# Patient Record
Sex: MALE | Race: WHITE | URBAN - NONMETROPOLITAN AREA
[De-identification: names, ages, dates, MRNs, and addresses within clinical notes are randomized per-mention and may not be internally consistent; named-entity substitution may affect disease eponyms.]

---

## 2017-09-14 ENCOUNTER — AMBULATORY - GICH (OUTPATIENT)
Dept: SCHEDULING | Facility: OTHER | Age: 72
End: 2017-09-14

## 2017-10-31 ENCOUNTER — OFFICE VISIT - GICH (OUTPATIENT)
Dept: ORTHOPEDICS | Facility: OTHER | Age: 72
End: 2017-10-31

## 2017-10-31 ENCOUNTER — HOSPITAL ENCOUNTER (OUTPATIENT)
Dept: RADIOLOGY | Facility: OTHER | Age: 72
End: 2017-10-31
Attending: ORTHOPAEDIC SURGERY

## 2017-10-31 ENCOUNTER — HISTORY (OUTPATIENT)
Dept: ORTHOPEDICS | Facility: OTHER | Age: 72
End: 2017-10-31

## 2017-10-31 DIAGNOSIS — M25.462 EFFUSION OF LEFT KNEE: ICD-10-CM

## 2017-10-31 DIAGNOSIS — M17.12 PRIMARY OSTEOARTHRITIS OF LEFT KNEE: ICD-10-CM

## 2017-10-31 DIAGNOSIS — M25.562 PAIN IN LEFT KNEE: ICD-10-CM

## 2017-12-27 NOTE — PROGRESS NOTES
Patient Information     Patient Name MRN Sex Jay Kay [id number] Male 1945      Progress Notes by Victor Hugo Mercado DO at 10/31/2017 12:45 PM     Author:  Victor Hugo Mercado DO Service:  (none) Author Type:  PHYS- Osteopathic     Filed:  10/31/2017  2:53 PM Encounter Date:  10/31/2017 Status:  Signed     :  Victor Hugo Mercado DO (PHYS- Osteopathic)            Jay Lang was seen for a chief complaint of left knee pain.    CHIEF COMPLAINT: Jay Lang is a 72 y.o.  male  Chief Complaint     Patient presents with       Consult      Left knee        HISTORY OF PRESENTING INJURY:  72-year-old male relates he began experiencing increased left knee pain over the past 6 months. He has received 2 cortisone injections to the knee. The most recent was about 3-4 months ago with no significant improvement of symptoms. He has pain to the knee area but often along the inside of the knee. Usually worse with weightbearing activities. Feeling a little bit better recently. The patient is from [place]. Relates he had x-rays of both knees taken in the past and was told he has arthritis more on the right side. He went to [facility] and had an MRI performed and presents here for initial left knee evaluation. No x-rays from outside source available at this time. The MRI available for review.  Treatment includes Tylenol.  Ambulates unassisted.  No significant right knee pain at this time.  No history of back or hip surgery.  Gen. health stable. The patient has a pilots license.    REVIEW OF SYSTEMS:  Constitutional:  Denies constitutional problems  Cardiovascular: normal  Respiratory: normal    The review of systems as documented in the HPI and on the intake questionnaire, completed by the patient 10/31/2017, have been reviewed by myself and the pertinent positives and negatives addressed.  The remainder of the complete review of systems was non-contributory.    ([facility]) PAST, FAMILY, and/or SOCIAL  HISTORY:    PAST MEDICAL HISTORY:  No past medical history on file.    PAST SURGICAL HISTORY:  No past surgical history on file.    ALLERGIES:  No Known Allergies    CURRENT MEDICATIONS:  No current outpatient prescriptions on file.     No current facility-administered medications for this visit.      Medications have been reviewed by me and are current to the best of my knowledge and ability.      FAMILY HISTORY:  No family history on file.    Additional [facility] information documented on the intake form completed by the patient 10/31/2017 was reviewed by myself.    PHYSICAL EXAM:   /78  Pulse 68  Ht 1.829 m (6')  Wt 95.3 kg (210 lb)  BMI 28.48 kg/m2 Body mass index is 28.48 kg/(m^2).    General Appearance: Pleasant male in good appearance, mood and affect.  Alert and orientated times three ( time, date and location).    Examination of Left knee:  Both knees demonstrate varus deformities with weightbearing. About 15 . Left greater than right.  Both knees demonstrate flexion contractures with standing and supine examination.  Both hips have comfortable passive motion with no anterior hip or groin pain.  No lumbar scars appreciated.    Left knee:  Skin: Normal.    Sensation is Normal  Alignment: Varum 15+ with weightbearing  Effusion: Mild to moderate  Passive extension: Lacks about 15+ degrees   Passive flexion: 120  Patella tracks:  without an inverted J sign  Varus stress testing: is stable  Valgus stress testing: is stable  Anterior drawer test: is stable  Posterior drawer test: is stable  Lachman's test: is stable  generalized medial sided knee pain with examination and palpation.    Hip: Bilateral  comfortable passive motion with no groin pain while seated or supine  both legs demonstrate mild lower extremity pitting edema.    negative pain with log roll testing    Calf:  negative tenderness    Neurological / Vascular Examination:    Sensation: Normal  Distal pulses: present    Feet:  Pes planus negative      Xray/MRI/MRA:  Radiographic images where independently reviewed and discussed with the patient.   X-ray of the left knee today demonstrates advanced osteoarthritis with medial bone-on-bone. Moderately advanced patellofemoral arthritis. Varus alignment.    MRI films and report demonstrates advanced left knee osteoarthritis involving the medial compartment with arthritic changes noted in the lateral compartment and patellofemoral.    IMPRESSION:  72-year-old male with advanced left knee osteoarthritis. Tricompartmental with bone-on-bone medial contact.  Varus deformity of the left knee and flexion contracture.  Right knee also demonstrates varus alignment and flexion contracture. No right knee x-rays for review.  History of prior left knee cortisone injection. The most recent about 3-4 months ago by history.     PLAN:  Discussion included review of x-ray and MRI. Discussed with the patient he has advanced left knee arthritis with associated flexion contracture and varus alignment.  Nonsurgical and surgical treatment options discussed. Nonsurgical including activity modification, arthritis medication, ice to the affected area.  Discussed cortisone and viscoelastic injections.  Also discussed recommendations to consider left knee replacement surgery.  The patient would like to try a left knee viscoelastic injection today.  The patient was informed regarding the cost of the injection.    PROCEDURE:  A written and oral informed consent was received from the patient.  Risks and benefits were discussed including but not limited to the risks of infection and continued symptoms. The patients left knee was sterilely prepped and draped after a timeout was performed. Utilizing ethyl chloride the area was cooled. The site was localized with 5 ml xylocaine 1% plain.  With sterile technique a 18 gauge needle was introduced and 32 ml of synovial fluid was aspirated.  Then 6 ml of synvisc one was injected.  A sterile bandage was  applied and the patient tolerated the procedure well.  They where given a handout about injections to take home.   Apply ice to the knee as needed.  The patient will call back as needed.  Questions answered.    Victor Hugo Mercado D.O., ROBLES.  Orthopedic Surgeon    [address] [address]  Phone [phone]  Fax [phone]    1:55 PM 10/31/2017

## 2017-12-30 NOTE — NURSING NOTE
Patient Information     Patient Name MRN Sex Jay Kay [id number] Male 1945      Nursing Note by Gosselin, Norma J at 10/31/2017 12:45 PM     Author:  Gosselin, Norma J  Service:  (none) Author Type:  (none)     Filed:  10/31/2017  4:19 PM  Encounter Date:  10/31/2017 Status:  Addendum     :  Gosselin, Norma J        Related Notes: Original Note by Gosselin, Norma J filed at 10/31/2017 12:55 PM            Consult left knee pain.  Norma J Gosselin LPN....................  10/31/2017   12:46 PM  Universal Protocol    A. Pre-procedure verification complete yes  1-relevant information / documentation available, reviewed and properly matched to the patient; 2-consent accurate and complete, 3-equipment and supplies available    B. Site marking complete N/A  Site marked if not in continuous attendance with patient    C. TIME OUT completed yes  Time Out was conducted just prior to starting procedure to verify the eight required elements: 1-patient identity, 2-consent accurate and complete, 3-position, 4-correct side/site marked (if applicable), 5-procedure, 6-relevant images / results properly labeled and displayed (if applicable), 7-antibiotics / irrigation fluids (if applicable), 8-safety precautions.

## 2018-01-27 VITALS
SYSTOLIC BLOOD PRESSURE: 124 MMHG | HEIGHT: 72 IN | BODY MASS INDEX: 28.44 KG/M2 | WEIGHT: 210 LBS | HEART RATE: 68 BPM | DIASTOLIC BLOOD PRESSURE: 78 MMHG

## 2018-03-07 ENCOUNTER — DOCUMENTATION ONLY (OUTPATIENT)
Dept: FAMILY MEDICINE | Facility: OTHER | Age: 73
End: 2018-03-07